# Patient Record
(demographics unavailable — no encounter records)

---

## 2024-11-04 NOTE — PHYSICAL EXAM
[Alert] : alert [Obese] : obese [No Acute Distress] : no acute distress [Normal Sclera/Conjunctiva] : normal sclera/conjunctiva [PERRL] : pupils equal, round and reactive to light [Normal Outer Ear/Nose] : the ears and nose were normal in appearance [Normal TMs] : both tympanic membranes were normal [No Neck Mass] : no neck mass was observed [Thyroid Not Enlarged] : the thyroid was not enlarged [No Respiratory Distress] : no respiratory distress [Clear to Auscultation] : lungs were clear to auscultation bilaterally [Normal S1, S2] : normal S1 and S2 [Normal Rate] : heart rate was normal [Regular Rhythm] : with a regular rhythm [Normal Bowel Sounds] : normal bowel sounds [Soft] : abdomen soft [Normal Gait] : normal gait [No Joint Swelling] : no joint swelling seen [No Rash] : no rash [No Skin Lesions] : no skin lesions [Oriented x3] : oriented to person, place, and time [Normal Insight/Judgement] : insight and judgment were intact

## 2024-11-04 NOTE — ASSESSMENT
[FreeTextEntry1] : 72 year old male here for evaluation of Multinodular goiter.  On In office Ultrasound he is found to have a right sided goitrous change and at this time I would hold off on FNA.  I am unable to visualize a truly discernible thyroid nodule on the right at this time. Appearance of an adenomatous goiter.  TSH of 0.4, will check full thyroid panel along with antibody testing at this time.    -Follow up in 3 months for repeat US

## 2024-11-04 NOTE — REVIEW OF SYSTEMS
[Fatigue] : no fatigue [Decreased Appetite] : appetite not decreased [Recent Weight Gain (___ Lbs)] : no recent weight gain [Recent Weight Loss (___ Lbs)] : no recent weight loss [Visual Field Defect] : no visual field defect [Dry Eyes] : no dryness [Dysphagia] : no dysphagia [Dysphonia] : no dysphonia [Chest Pain] : no chest pain [Palpitations] : no palpitations [Shortness Of Breath] : no shortness of breath [Nausea] : no nausea [Vomiting] : no vomiting [Polyuria] : no polyuria [Hesistancy] : no hesitancy [Joint Pain] : no joint pain [Acanthosis] : no acanthosis  [Headaches] : no headaches [Tremors] : no tremors [Depression] : no depression [Polydipsia] : no polydipsia [Easy Bleeding] : no ~M tendency for easy bleeding [Easy Bruising] : no tendency for easy bruising

## 2024-11-04 NOTE — HISTORY OF PRESENT ILLNESS
[FreeTextEntry1] : 72 year old male here for evaluation of a goiter.  He reported that he was having a stiff neck and subsequently went to see his PCP, Dr. Phillips.  A Neck US was ordered and showed multiple thyroid nodules.   Patient denied any family history of thyroid cancer and there was no previous head or neck radiation exposure.  TSH was 0.46  No overt hyper or hypothyroid symptoms.  He takes Eliquis 5 mg BID for A.fib     	 EXAM: [id number] - US THYROID AND PARATHYROID  - ORDERED BY: SUNITA PHILLIPS   PROCEDURE DATE:  10/07/2024    INTERPRETATION:  THYROID ULTRASOUND with Doppler dated 10/7/2024 1:01 PM  Indication: Thyroid nodules  Technique: Ultrasound evaluation of the thyroid was performed in axial and sagittal projections. Color Doppler evaluation was also performed.  Prior study: None  Findings:  RIGHT LOBE: Dimensions: 7.9 x 3.9 x 3.3 cm Echotexture: Homogeneous Vascularity: Normal  Nodule 1: Location: Mid lobe to lower pole Dimensions: 4.1 x 3.0 x 2.4 cm Composition: Solid or almost completely solid (2 points) Echogenicity: Isoechoic Shape: Taller than wide Margin: Ill-defined Echogenic Foci: None  TI-RADS Score: 4   Nodule 2: Location: Upper pole Dimensions: 0.9 x 1.5 x 0.8 cm Composition: Solid or almost completely solid (2 points) Echogenicity: Isoechoic Shape: Taller than wide Margin: Ill-defined Echogenic Foci: None  TI-RADS Score: 4  LEFT LOBE: Dimensions: 7.3 x 3.3 x 3.0 cm Echotexture: Homogeneous Vascularity: Normal  Nodule 1: Location: Mid lobe Dimensions: 1.0 x 0.8 x 0.9 cm Composition: Solid or almost completely solid (2 points) Echogenicity: Hypoechoic Shape: Wider than tall Margin: Ill-defined Echogenic Foci: None  TI-RADS Score: 4  Nodule 2: Location: Upper pole Dimensions: 0.6 x 0.3 x 0.6 cm Composition: Solid or almost completely solid (2 points) Echogenicity: Hypoechoic Shape: Wider than tall Margin: Ill-defined Echogenic Foci: None  TI-RADS Score: 4   Nodule 3: Location: Mid lobe Dimensions: 1.2 x 1.1 x 1.2 cm Composition: Solid or almost completely solid (2 points) Echogenicity: Isoechoic Shape: Wider than tall Margin: Ill-defined Echogenic Foci: Macrocalcification  TI-RADS Score: 4  Nodule 4: Location: Lower pole Dimensions: 1.4 x 1.0 x 1.0 cm Composition: Solid or almost completely solid (2 points) Echogenicity: Hypoechoic Shape: Wider than tall Margin: Ill-defined Echogenic Foci: None  TI-RADS Score: 4  ISTHMUS: Dimensions: 0.4 cm AP  Nodule 1: Location: Left isthmus Dimensions: 1.0 x 0.8 x 0.9 cm Composition: Solid or almost completely solid (2 points) Echogenicity: Isoechoic Shape: Taller than wide Margin: Ill-defined Echogenic Foci: None  TI-RADS Score: 4   Cervical Lymph Node Evaluation: No abnormal lymph nodes are identified in the neck.  Parathyroid Examination: Parathyroid glands not visualized.   IMPRESSION: Multinodular thyroid. 4.1 cm nodule in the right thyroid lobe meets criteria for biopsy.

## 2024-11-22 NOTE — PHYSICAL EXAM
[Well Developed] : well developed [Well Nourished] : well nourished [No Acute Distress] : no acute distress [Normal Conjunctiva] : normal conjunctiva [Normal Venous Pressure] : normal venous pressure [No Carotid Bruit] : no carotid bruit [Normal S1, S2] : normal S1, S2 [No Murmur] : no murmur [No Rub] : no rub [No Gallop] : no gallop [Clear Lung Fields] : clear lung fields [Good Air Entry] : good air entry [No Respiratory Distress] : no respiratory distress  [Soft] : abdomen soft [Non Tender] : non-tender [No Masses/organomegaly] : no masses/organomegaly [Normal Bowel Sounds] : normal bowel sounds [Normal Gait] : normal gait [No Edema] : no edema [No Cyanosis] : no cyanosis [No Clubbing] : no clubbing [No Varicosities] : no varicosities [No Rash] : no rash [No Skin Lesions] : no skin lesions [Moves all extremities] : moves all extremities [No Focal Deficits] : no focal deficits [Normal Speech] : normal speech [Alert and Oriented] : alert and oriented [Normal memory] : normal memory [General Appearance - Well Developed] : well developed [Normal Appearance] : normal appearance [Well Groomed] : well groomed [General Appearance - Well Nourished] : well nourished [No Deformities] : no deformities [General Appearance - In No Acute Distress] : no acute distress [Left Infraclavicular] : left infraclavicular area [Clean] : clean [Dry] : dry [Well-Healed] : well-healed

## 2024-11-25 NOTE — HISTORY OF PRESENT ILLNESS
[Shortness of Breath] : shortness of breath [Edema] : edema [FreeTextEntry1] : 71 year old male with HFrEF (EF 25-30% 10/2023, improved to 53% 5/2024), NYHA Class II symptoms, left bundle branch block, and newly diagnosed AF here for follow-up. He originally reported an insidious onset of decrease in exercise capacity over the last year. He states it has improved since the last time he was seen. In October 2023, he was shown to have a new LBBB and an TTE was performed which showed a severely dilated LV with severe LV dysfunction.  SInce then he has been started on high doses of GDMT which he has been tolerating well.  He reports a modest improvement in exercise capacity since then. He is now s/p BSci BIV-ICD (3/1/2024) and presents today for follow-up.     Remote transmissions were significant for newly diagnosed PAF lasting 48H in 5/2024 and he was started on Eliquis. He believes he was tired at that time. Otherwise he has been feeling well. He had a repeat echo last month which showed improvement in EF. He offers no device related complaints.   Repeat echo 5/2024: LVEf 53%

## 2024-11-25 NOTE — CARDIOLOGY SUMMARY
[de-identified] : EKG 01/24/24: NSR @ 67 bpm, frequent PVCs, LBBB [de-identified] : ECHO 10/04/2023: LV cavity is severely dilated. Severely reduced LV systolic function. LA is normal in size. Mild primary MVR. No AR. Mild TR/Mild NH. Mild aortic root dilation. IVC normal in size. Severe LV dysfunction. EF: 24%.  ECHO 1/24/24:  1. Moderately dilated left ventricular size.  2. Mild symmetric left ventricular hypertrophy.  3. Left ventricular systolic function is moderately reduced with a  calculated ejection fraction of 30-35% with global hypokinesis.  4. Normal right ventricular size and systolic function.  5. Aortic sclerosis without significant stenosis.  6. No evidence of pulmonary hypertension.  7. No pericardial effusion.  8. No prior echo is available for comparison.

## 2024-11-25 NOTE — PROCEDURE
[No] : not [NSR] : normal sinus rhythm [CRT-D] : Cardiac resynchronization therapy defibrillator [[employer]] : [facility] [DDD] : DDD [Sensing Amplitude ___mv] : sensing amplitude was [unfilled] mv [Lead Imp:  ___ohms] : lead impedance was [unfilled] ohms [___V @] : [unfilled] V [___ ms] : [unfilled] ms [de-identified] : [facility] [de-identified] : Resonate HF CRTD [de-identified] : 3/1/2024 [de-identified] : 50 [de-identified] : A < 1%  RV 59% LV pace only 100%  AT/AF < 2% on Eliquis 5mg PO BID

## 2024-11-25 NOTE — CARDIOLOGY SUMMARY
[de-identified] : EKG 01/24/24: NSR @ 67 bpm, frequent PVCs, LBBB [de-identified] : ECHO 10/04/2023: LV cavity is severely dilated. Severely reduced LV systolic function. LA is normal in size. Mild primary MVR. No AR. Mild TR/Mild MT. Mild aortic root dilation. IVC normal in size. Severe LV dysfunction. EF: 24%.  ECHO 1/24/24:  1. Moderately dilated left ventricular size.  2. Mild symmetric left ventricular hypertrophy.  3. Left ventricular systolic function is moderately reduced with a  calculated ejection fraction of 30-35% with global hypokinesis.  4. Normal right ventricular size and systolic function.  5. Aortic sclerosis without significant stenosis.  6. No evidence of pulmonary hypertension.  7. No pericardial effusion.  8. No prior echo is available for comparison.

## 2024-11-25 NOTE — ADDENDUM
[FreeTextEntry1] : I, Ketan Mendoza, am scribing for and the presence of Dr. Jean the following sections: HPI, PMH,Family/social history, ROS, Physical Exam, Assessment / Plan.  I, Onur Jean, personally performed the services described in the documentation, reviewed the documentation recorded by the scribe in my presence and it accurately and completely records my words and actions.

## 2024-11-25 NOTE — PROCEDURE
[No] : not [NSR] : normal sinus rhythm [CRT-D] : Cardiac resynchronization therapy defibrillator [[employer]] : [facility] [DDD] : DDD [Sensing Amplitude ___mv] : sensing amplitude was [unfilled] mv [Lead Imp:  ___ohms] : lead impedance was [unfilled] ohms [___V @] : [unfilled] V [___ ms] : [unfilled] ms [de-identified] : [facility] [de-identified] : Resonate HF CRTD [de-identified] : 3/1/2024 [de-identified] : 50 [de-identified] : A < 1%  RV 59% LV pace only 100%  AT/AF < 2% on Eliquis 5mg PO BID

## 2024-11-25 NOTE — REASON FOR VISIT
[Follow-up Device Check] : is here today for a follow-up device check visit for [Other ___] : [unfilled] [Symptom and Test Evaluation] : symptom and test evaluation [Cardiac Failure] : cardiac failure [Arrhythmia/ECG Abnorrmalities] : arrhythmia/ECG abnormalities [FreeTextEntry3] : Dr. Macdonald

## 2025-02-24 NOTE — ASSESSMENT
[FreeTextEntry1] : 72 year old male here for evaluation of Multinodular goiter. +Hashimotos disease  On In office Ultrasound he is found to have a right sided goitrous change and at this time I would hold off on FNA.  I am unable to visualize a truly discernible thyroid nodule on the right at this time. Appearance of an adenomatous goiter.  Left sided nodules are also starting to take up a goitrous appearance.  -Will monitor closely, held off on FNA at this time.   -Follow up in 6 months for repeat US and BW

## 2025-02-24 NOTE — REVIEW OF SYSTEMS
[Fatigue] : no fatigue [Decreased Appetite] : appetite not decreased [Visual Field Defect] : no visual field defect [Dysphonia] : no dysphonia [Chest Pain] : no chest pain [Palpitations] : no palpitations [Shortness Of Breath] : no shortness of breath [Nausea] : no nausea [Vomiting] : no vomiting [Polyuria] : no polyuria [Hesistancy] : no hesitancy [Joint Pain] : no joint pain [Muscle Weakness] : no muscle weakness [Acanthosis] : no acanthosis  [Acne] : no acne [Headaches] : no headaches [Tremors] : no tremors [Depression] : no depression [Polydipsia] : no polydipsia [Easy Bleeding] : no ~M tendency for easy bleeding [Easy Bruising] : no tendency for easy bruising

## 2025-02-24 NOTE — HISTORY OF PRESENT ILLNESS
[FreeTextEntry1] : 72 year old male here for f/u of a goiter.  He reported that he was having a stiff neck and subsequently went to see his PCP, Dr. Phillips.  A Neck US was ordered and showed multiple thyroid nodules.   Patient denied any family history of thyroid cancer and there was no previous head or neck radiation exposure.  TSH was 0.46  No overt hyper or hypothyroid symptoms.  He takes Eliquis 5 mg BID for A.fib     	 EXAM: [id number] - US THYROID AND PARATHYROID  - ORDERED BY: SUNITA PHILLIPS   PROCEDURE DATE:  10/07/2024    INTERPRETATION:  THYROID ULTRASOUND with Doppler dated 10/7/2024 1:01 PM  Indication: Thyroid nodules  Technique: Ultrasound evaluation of the thyroid was performed in axial and sagittal projections. Color Doppler evaluation was also performed.  Prior study: None  Findings:  RIGHT LOBE: Dimensions: 7.9 x 3.9 x 3.3 cm Echotexture: Homogeneous Vascularity: Normal  Nodule 1: Location: Mid lobe to lower pole Dimensions: 4.1 x 3.0 x 2.4 cm Composition: Solid or almost completely solid (2 points) Echogenicity: Isoechoic Shape: Taller than wide Margin: Ill-defined Echogenic Foci: None  TI-RADS Score: 4   Nodule 2: Location: Upper pole Dimensions: 0.9 x 1.5 x 0.8 cm Composition: Solid or almost completely solid (2 points) Echogenicity: Isoechoic Shape: Taller than wide Margin: Ill-defined Echogenic Foci: None  TI-RADS Score: 4  LEFT LOBE: Dimensions: 7.3 x 3.3 x 3.0 cm Echotexture: Homogeneous Vascularity: Normal  Nodule 1: Location: Mid lobe Dimensions: 1.0 x 0.8 x 0.9 cm Composition: Solid or almost completely solid (2 points) Echogenicity: Hypoechoic Shape: Wider than tall Margin: Ill-defined Echogenic Foci: None  TI-RADS Score: 4  Nodule 2: Location: Upper pole Dimensions: 0.6 x 0.3 x 0.6 cm Composition: Solid or almost completely solid (2 points) Echogenicity: Hypoechoic Shape: Wider than tall Margin: Ill-defined Echogenic Foci: None  TI-RADS Score: 4   Nodule 3: Location: Mid lobe Dimensions: 1.2 x 1.1 x 1.2 cm Composition: Solid or almost completely solid (2 points) Echogenicity: Isoechoic Shape: Wider than tall Margin: Ill-defined Echogenic Foci: Macrocalcification  TI-RADS Score: 4  Nodule 4: Location: Lower pole Dimensions: 1.4 x 1.0 x 1.0 cm Composition: Solid or almost completely solid (2 points) Echogenicity: Hypoechoic Shape: Wider than tall Margin: Ill-defined Echogenic Foci: None  TI-RADS Score: 4  ISTHMUS: Dimensions: 0.4 cm AP  Nodule 1: Location: Left isthmus Dimensions: 1.0 x 0.8 x 0.9 cm Composition: Solid or almost completely solid (2 points) Echogenicity: Isoechoic Shape: Taller than wide Margin: Ill-defined Echogenic Foci: None  TI-RADS Score: 4   Cervical Lymph Node Evaluation: No abnormal lymph nodes are identified in the neck.  Parathyroid Examination: Parathyroid glands not visualized.   IMPRESSION: Multinodular thyroid. 4.1 cm nodule in the right thyroid lobe meets criteria for biopsy.

## 2025-03-25 NOTE — PROCEDURE
[de-identified] : [facility] [de-identified] : Resonate HF CRTD [de-identified] : 3/1/2024 [de-identified] : 50 [de-identified] : A < 1%  RV 59% LV pace only 100%  AT/AF < 2% on Eliquis 5mg PO BID

## 2025-03-25 NOTE — PROCEDURE
[de-identified] : [facility] [de-identified] : Resonate HF CRTD [de-identified] : 3/1/2024 [de-identified] : A < 1%  RV 59% LV pace only 100%  AT/AF < 2% on Eliquis 5mg PO BID  [de-identified] : 50

## 2025-03-25 NOTE — ADDENDUM
[FreeTextEntry1] : I, Peri Carl, am scribing for and the presence of Dr. Jean the following sections: HPI, PMH,Family/social history, ROS, Physical Exam, Assessment / Plan. I, Onur Jean, personally performed the services described in the documentation, reviewed the documentation recorded by the scribe in my presence and it accurately and completely records my words and actions.

## 2025-03-25 NOTE — DISCUSSION/SUMMARY
[FreeTextEntry1] : 72 year old male with HFrEF (EF 25-30% 10/2023, improved to 53% 5/2024), NYHA Class II symptoms, left bundle branch block, and paroxysmal  AF here for follow-up. He originally reported an insidious onset of decrease in exercise capacity over the last year. He states it has improved since the last time he was seen. In October 2023, he was shown to have a new LBBB and an TTE was performed which showed a severely dilated LV with severe LV dysfunction.  Tolerating GDMT and EF remained < 35%. He is now s/p BSci BIV-ICD (3/1/2024).  Also with finding of paroxysmal atrial fibrllation  1.  LBBB and CMP s/p BiV ICD 3/2024 No s/s acute HF on exam  Continue GDMT as per Dr. Macdonald The device is functioning appropriately as programmed and all measured data is WNL.  The patient is enrolled in remote monitoring  2.  Atrial fibrillation Present in sinus rhythm  Overall burden remains <1%.  Discussed options for management pending clinical progress; rate control, AAD therapy and catheter based ablation were discussed Continue Carvedilol for rate control  3.  Stroke Risk CHADSVASc at least 2-3 Continue OAC for TE/CVA ppx   He was asked to return for follow-up in one year.  Advised to call with any questions or concerns in the interim.

## 2025-03-25 NOTE — ADDENDUM
[FreeTextEntry1] : I, Peri Carl, am scribing for and the presence of Dr. Jean the following sections: HPI, PMH,Family/social history, ROS, Physical Exam, Assessment / Plan. I, Onur eJan, personally performed the services described in the documentation, reviewed the documentation recorded by the scribe in my presence and it accurately and completely records my words and actions.

## 2025-03-25 NOTE — HISTORY OF PRESENT ILLNESS
[FreeTextEntry1] : 72 year old male with HFrEF (EF 25-30% 10/2023, improved to 53% 5/2024), NYHA Class II symptoms, left bundle branch block, and newly diagnosed AF here for follow-up. He originally reported an insidious onset of decrease in exercise capacity over the last year. He states it has improved since the last time he was seen. In October 2023, he was shown to have a new LBBB and an TTE was performed which showed a severely dilated LV with severe LV dysfunction.  Tolerating GDMT and EF remained < 35%. He is now s/p BSci BIV-ICD (3/1/2024) and presents today for follow-up.     He has had paroxysmal atrial fibrillation recognized on remote monitoring 5/2024 (48 hours duration).  Tolerating Eliquis without any bleeding issues.  He denies any awareness of rapid/irregular heart action.    He offers no device related complaints.   Repeat echo 5/2024: LVEf 53%

## 2025-03-25 NOTE — CARDIOLOGY SUMMARY
[de-identified] : EKG 01/24/24: NSR @ 67 bpm, frequent PVCs, LBBB [de-identified] : ECHO 10/04/2023: LV cavity is severely dilated. Severely reduced LV systolic function. LA is normal in size. Mild primary MVR. No AR. Mild TR/Mild [place]. Mild aortic root dilation. IVC normal in size. Severe LV dysfunction. EF: 24%.  ECHO 1/24/24:  1. Moderately dilated left ventricular size.  2. Mild symmetric left ventricular hypertrophy.  3. Left ventricular systolic function is moderately reduced with a  calculated ejection fraction of 30-35% with global hypokinesis.  4. Normal right ventricular size and systolic function.  5. Aortic sclerosis without significant stenosis.  6. No evidence of pulmonary hypertension.  7. No pericardial effusion.  8. No prior echo is available for comparison.

## 2025-03-25 NOTE — CARDIOLOGY SUMMARY
[de-identified] : EKG 01/24/24: NSR @ 67 bpm, frequent PVCs, LBBB [de-identified] : ECHO 10/04/2023: LV cavity is severely dilated. Severely reduced LV systolic function. LA is normal in size. Mild primary MVR. No AR. Mild TR/Mild [place]. Mild aortic root dilation. IVC normal in size. Severe LV dysfunction. EF: 24%.  ECHO 1/24/24:  1. Moderately dilated left ventricular size.  2. Mild symmetric left ventricular hypertrophy.  3. Left ventricular systolic function is moderately reduced with a  calculated ejection fraction of 30-35% with global hypokinesis.  4. Normal right ventricular size and systolic function.  5. Aortic sclerosis without significant stenosis.  6. No evidence of pulmonary hypertension.  7. No pericardial effusion.  8. No prior echo is available for comparison.